# Patient Record
Sex: MALE
[De-identification: names, ages, dates, MRNs, and addresses within clinical notes are randomized per-mention and may not be internally consistent; named-entity substitution may affect disease eponyms.]

---

## 2023-05-10 PROBLEM — Z00.00 ENCOUNTER FOR PREVENTIVE HEALTH EXAMINATION: Status: ACTIVE | Noted: 2023-05-10

## 2023-05-26 ENCOUNTER — APPOINTMENT (OUTPATIENT)
Dept: OTOLARYNGOLOGY | Facility: CLINIC | Age: 43
End: 2023-05-26
Payer: COMMERCIAL

## 2023-05-26 VITALS
TEMPERATURE: 98 F | HEIGHT: 64 IN | DIASTOLIC BLOOD PRESSURE: 74 MMHG | WEIGHT: 180 LBS | HEART RATE: 77 BPM | SYSTOLIC BLOOD PRESSURE: 124 MMHG | BODY MASS INDEX: 30.73 KG/M2

## 2023-05-26 DIAGNOSIS — J35.1 HYPERTROPHY OF TONSILS: ICD-10-CM

## 2023-05-26 DIAGNOSIS — G47.9 SLEEP DISORDER, UNSPECIFIED: ICD-10-CM

## 2023-05-26 DIAGNOSIS — K13.79 OTHER LESIONS OF ORAL MUCOSA: ICD-10-CM

## 2023-05-26 DIAGNOSIS — G47.33 OBSTRUCTIVE SLEEP APNEA (ADULT) (PEDIATRIC): ICD-10-CM

## 2023-05-26 DIAGNOSIS — R09.89 OTHER SPECIFIED SYMPTOMS AND SIGNS INVOLVING THE CIRCULATORY AND RESPIRATORY SYSTEMS: ICD-10-CM

## 2023-05-26 DIAGNOSIS — K21.9 GASTRO-ESOPHAGEAL REFLUX DISEASE W/OUT ESOPHAGITIS: ICD-10-CM

## 2023-05-26 DIAGNOSIS — Z99.89 OBSTRUCTIVE SLEEP APNEA (ADULT) (PEDIATRIC): ICD-10-CM

## 2023-05-26 PROCEDURE — 31575 DIAGNOSTIC LARYNGOSCOPY: CPT

## 2023-05-26 PROCEDURE — 99204 OFFICE O/P NEW MOD 45 MIN: CPT | Mod: 25

## 2023-05-26 NOTE — PROCEDURE
[de-identified] : -\par Laryngoscopy: Flexible Laryngoscopy - Procedure Note\par \par Pre-operative Diagnosis: SHAMEKA\par Post-operative Diagnosis: tonsillar hypertrophy With oropharyngeal crowding\par Anesthesia: Topical - 1 % Lidocaine/Phenylephrine\par Procedure: Flexible Laryngoscopy\par \par Procedure Details: \par The patient was placed in the sitting position. After decongestant and anesthesia were applied the laryngoscope was passed. The nasal cavities, nasopharynx, oropharynx, hypopharynx, and larynx were all examined. Vocal folds were examined during respiration and phonation. The following findings were noted:\par \par Findings: \par Nose: Septum is midline, turbinates are normal, nasal airways patent, mucosa normal\par Nasopharynx: Adenoids normal, no masses, eustachian tube normal\par Oropharynx: Pharyngeal walls symmetric and without lesion. Tonsils/fossae symmetric and hypertrophied. + oropharyngeal crowding. \par Hypopharynx: Hypopharynx and pyriform sinuses without lesion. No masses or asymmetry. No pooling of secretions.\par Larynx: Epiglottis and aryepiglottic folds were sharp and crisp bilaterally. Bilateral false and true vocal folds normal appearance. Bilateral vocal folds fully mobile and symmetric. Airway was widely patent. + post cricoid edema \par \par Condition: Stable. Patient tolerated procedure well.\par \par Complications: None\par \par

## 2023-05-26 NOTE — CONSULT LETTER
[Dear  ___] : Dear  [unfilled], [Consult Letter:] : I had the pleasure of evaluating your patient, [unfilled]. [Please see my note below.] : Please see my note below. [Consult Closing:] : Thank you very much for allowing me to participate in the care of this patient.  If you have any questions, please do not hesitate to contact me. [Sincerely,] : Sincerely, [FreeTextEntry3] : Arvin Garcia MD\par Director; [facility]\par Otolaryngology - Head and Neck Surgery\par [facility]\par \par \par Department of Otolaryngology\par [facility]\par \par [address]\par [facility], 10th Floor\[place], [place], [address]\par Office Tel: [phone]\par \par \par

## 2023-05-26 NOTE — ASSESSMENT
[FreeTextEntry1] : 42 year old male with history of SHAMEKA on CPAP presents as referred from PCP for tonsillar hypertrophy. 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 Based on his history and exam findings, I am recommending a repeat polysomnogram with follow up to review. Ultimately, I do believe patient will need a tonsillectomy and uvulectomy,  palatoplasty. \par \par - repeat polysomnogram\par - follow up to review

## 2023-05-26 NOTE — PHYSICAL EXAM
[Midline] : trachea located in midline position [Laryngoscopy Performed] : laryngoscopy was performed, see procedure section for findings [Normal] : no rashes [de-identified] : 4+ bilaterally  [de-identified] : uvular hypertrophy

## 2023-05-26 NOTE — HISTORY OF PRESENT ILLNESS
[de-identified] : 5/26/23\par 42M with history of SHAMEKA (using CPAP machine for 4 years) presents as referral from PCP for ENT evaluation due to tonsillar hypertrophy. He does report a 7 year history of snoring, witnessed apnea episodes and waking up gasping for air. Denies throat pain. He does feel like food and liquid gets stuck in his throat about 2-3 x a week with coughing episodes. No voice changes. No other ENT issues. \par

## 2023-05-26 NOTE — REASON FOR VISIT
[Initial Consultation] : an initial consultation for [FreeTextEntry2] : SHAMEKA, tonsillar hypertrophy